# Patient Record
Sex: MALE | Race: WHITE | Employment: OTHER | URBAN - METROPOLITAN AREA
[De-identification: names, ages, dates, MRNs, and addresses within clinical notes are randomized per-mention and may not be internally consistent; named-entity substitution may affect disease eponyms.]

---

## 2024-09-17 ENCOUNTER — HOSPITAL ENCOUNTER (OUTPATIENT)
Age: 69
Discharge: HOME | End: 2024-09-17
Payer: MEDICARE

## 2024-09-17 VITALS
TEMPERATURE: 99 F | SYSTOLIC BLOOD PRESSURE: 138 MMHG | HEART RATE: 97 BPM | OXYGEN SATURATION: 96 % | DIASTOLIC BLOOD PRESSURE: 72 MMHG | RESPIRATION RATE: 20 BRPM

## 2024-09-17 DIAGNOSIS — N39.0 URINARY TRACT INFECTION WITHOUT HEMATURIA, SITE UNSPECIFIED: Primary | ICD-10-CM

## 2024-09-17 PROCEDURE — 99203 OFFICE O/P NEW LOW 30 MIN: CPT | Performed by: PHYSICIAN ASSISTANT

## 2024-09-17 RX ORDER — CHLORTHALIDONE 25 MG/1
1 TABLET ORAL DAILY
COMMUNITY
Start: 2024-07-24

## 2024-09-17 RX ORDER — CEPHALEXIN 500 MG/1
500 CAPSULE ORAL 3 TIMES DAILY
Qty: 21 CAPSULE | Refills: 0 | Status: SHIPPED | OUTPATIENT
Start: 2024-09-17 | End: 2024-09-24

## 2024-09-17 RX ORDER — LOSARTAN POTASSIUM 100 MG/1
100 TABLET ORAL DAILY
COMMUNITY
Start: 2024-02-15

## 2024-09-17 RX ORDER — ATORVASTATIN CALCIUM 20 MG/1
20 TABLET, FILM COATED ORAL DAILY
COMMUNITY
Start: 2024-07-24 | End: 2024-10-22

## 2024-09-17 NOTE — ED INITIAL ASSESSMENT (HPI)
Pt complaining of pain with urination since Sunday.  Pt started AZO yesterday.  No fever or back pain.

## 2024-09-17 NOTE — ED PROVIDER NOTES
Patient Seen in: [facility]    History     Chief Complaint   Patient presents with    Urinary Symptoms     Stated Complaint: Urinary Problem    HPI    Andrea Fu is a 69 year old male who presents with chief complaint of dysuria.  Onset 2 days ago.  Patient denies fever, chills, abdominal pain, nausea, vomiting, diarrhea, constipation, melena, hematochezia, hematuria, flank pain, scrotal pain, scrotal swelling, scrotal redness.        Past Medical History:    Diabetes (HCC)    Essential hypertension    Hyperlipidemia       History reviewed. No pertinent surgical history.         No family history on file.    Social History     Socioeconomic History    Marital status:      Social Determinants of Health     Financial Resource Strain: Low Risk  (9/20/2022)    Received from [facility], [facility]    Overall Financial Resource Strain (CARDIA)     Difficulty of Paying Living Expenses: Not hard at all   Food Insecurity: No Food Insecurity (7/30/2024)    Received from [facility]    Food Insecurity     Within the past 30 days, I worried whether my food would run out before I got money to buy more. / En los últimos 30 días, me preocupó que la comida se podía acabar antes de tener dinero para compr...: Never true / Nunca     Within the past 30 days, the food that I bought just didn't last, and I didn't have money to get more. / En los últimos 30 días, La comida que compré no rindió lo suficiente, y no tenía dinero para...: Never true / Nunca   Physical Activity: Sufficiently Active (9/30/2020)    Received from [facility], [facility]    Exercise Vital Sign     Days of Exercise per Week: 5 days     Minutes of Exercise per Session: 60 min   Stress: No Stress Concern Present (9/30/2020)    Received from [facility], [facility] [place] of  Occupational Health - Occupational Stress Questionnaire     Feeling of Stress : Not at all   Social Connections: Moderately Integrated (9/30/2020)    Received from [employer], check24 [facility]    Social Connection and Isolation Panel [NHANES]     Frequency of Communication with Friends and Family: More than three times a week     Frequency of Social Gatherings with Friends and Family: Twice a week     Attends Anabaptist Services: More than 4 times per year     Active Member of Clubs or Organizations: No     Attends Club or Organization Meetings: Never     Marital Status:    Housing Stability: Unknown (9/20/2022)    Received from [facility], check24 [facility]    Housing Stability Vital Sign     Unable to Pay for Housing in the Last Year: No     Unstable Housing in the Last Year: No       Review of Systems    Positive for stated complaint: Urinary Problem  Other systems are as noted in HPI.  Constitutional and vital signs reviewed.      All other systems reviewed and negative except as noted above.    PSFH elements reviewed from today and agreed except as otherwise stated in HPI.    Physical Exam     ED Triage Vitals [09/17/24 1123]   /72   Pulse 97   Resp 20   Temp 98.7 °F (37.1 °C)   Temp src Temporal   SpO2 96 %   O2 Device None (Room air)       Current:/72   Pulse 97   Temp 98.7 °F (37.1 °C) (Temporal)   Resp 20   SpO2 96%     PULSE OX within normal limits on room air as interpreted by this provider.        Physical Exam    Constitutional: The patient is cooperative. Appears well-developed and well-nourished.  No acute distress.  Psychological: Alert, No abnormalities of mood, affect.  Head: Normocephalic/atraumatic.  Eyes: Pupils are equal round reactive to light.  Conjunctiva are within normal limits.  ENT: Oropharynx is clear.  Mucous membranes moist.  Neck: The neck is supple.  No meningeal signs.  Chest: There is no  tenderness to the chest wall.  No CVA tenderness bilaterally.  Respiratory: Respiratory effort was normal.  There is no stridor.  Air entry is equal.  Cardiovascular: Regular rate and rhythm.  Capillary refill is brisk. \  Gastrointestinal: Abdomen soft, nontender, nondistended.  There is no rebound tenderness or guarding.  No organomegaly is noted. No peritoneal signs.  Normal bowel sounds.  Genitourinary: Not examined.  Lymphatic: No gross lymphadenopathy noted.  Musculoskeletal: Musculoskeletal system is grossly intact.  There is no obvious deformity.  Neurological: Gross motor movement is intact in all 4 extremities.  Patient exhibits normal speech.  Skin: Skin is normal to inspection.  Warm and dry.  No obvious bruising.  No obvious rash.            ED Course     Labs Reviewed   URINE CULTURE, ROUTINE       MDM     Partial diagnosis including but not limited to UTI, urolithiasis, urethritis, balanitis    Unable to perform urine dip as patient has taken Azo.  Urine culture pending.  Will treat empirically with Keflex.    Physical exam remained stable as previously documented.  Physical exam findings discussed with patient.    I have given the patient instructions regarding their diagnoses, expectations, follow up, and ER precautions. I explained to the patient that emergent conditions may arise and to go to the ER for new, worsening or any persistent conditions. I've explained the importance of following up with their doctor as instructed. The patient verbalized understanding of the discharge instructions and plan.    Disposition and Plan     Clinical Impression:  1. Urinary tract infection without hematuria, site unspecified        Disposition:  Discharge    Follow-up:  Marisa Nichols PA  [address] [facility] [address]  [phone]    Call in 1 day  For follow-up      Medications Prescribed:  Current Discharge Medication List        START taking these medications    Details   cephALEXin (KEFLEX) 500 MG  Oral Cap Take 1 capsule (500 mg total) by mouth 3 (three) times daily for 7 days.  Qty: 21 capsule, Refills: 0